# Patient Record
Sex: FEMALE | Race: BLACK OR AFRICAN AMERICAN | NOT HISPANIC OR LATINO | URBAN - METROPOLITAN AREA
[De-identification: names, ages, dates, MRNs, and addresses within clinical notes are randomized per-mention and may not be internally consistent; named-entity substitution may affect disease eponyms.]

---

## 2022-10-09 ENCOUNTER — EMERGENCY (EMERGENCY)
Facility: HOSPITAL | Age: 42
LOS: 1 days | Discharge: HOME | End: 2022-10-09
Attending: STUDENT IN AN ORGANIZED HEALTH CARE EDUCATION/TRAINING PROGRAM
Payer: COMMERCIAL

## 2022-10-09 VITALS
TEMPERATURE: 98 F | DIASTOLIC BLOOD PRESSURE: 89 MMHG | SYSTOLIC BLOOD PRESSURE: 137 MMHG | RESPIRATION RATE: 16 BRPM | OXYGEN SATURATION: 100 % | HEART RATE: 69 BPM

## 2022-10-09 LAB — HCG UR QL: NEGATIVE — SIGNIFICANT CHANGE UP

## 2022-10-09 PROCEDURE — 73590 X-RAY EXAM OF LOWER LEG: CPT | Mod: 26,RT

## 2022-10-09 PROCEDURE — 81025 URINE PREGNANCY TEST: CPT

## 2022-10-09 PROCEDURE — 72125 CT NECK SPINE W/O DYE: CPT | Mod: 26,MA

## 2022-10-09 PROCEDURE — 73564 X-RAY EXAM KNEE 4 OR MORE: CPT | Mod: 26,RT

## 2022-10-09 PROCEDURE — 70450 CT HEAD/BRAIN W/O DYE: CPT | Mod: MA

## 2022-10-09 PROCEDURE — 73564 X-RAY EXAM KNEE 4 OR MORE: CPT

## 2022-10-09 PROCEDURE — 70450 CT HEAD/BRAIN W/O DYE: CPT | Mod: 26,MA

## 2022-10-09 PROCEDURE — 72125 CT NECK SPINE W/O DYE: CPT | Mod: MA

## 2022-10-09 PROCEDURE — 72131 CT LUMBAR SPINE W/O DYE: CPT | Mod: MA

## 2022-10-09 PROCEDURE — 73610 X-RAY EXAM OF ANKLE: CPT

## 2022-10-09 PROCEDURE — 73590 X-RAY EXAM OF LOWER LEG: CPT

## 2022-10-09 PROCEDURE — 99285 EMERGENCY DEPT VISIT HI MDM: CPT

## 2022-10-09 PROCEDURE — 73610 X-RAY EXAM OF ANKLE: CPT | Mod: 26,RT

## 2022-10-09 PROCEDURE — 72131 CT LUMBAR SPINE W/O DYE: CPT | Mod: 26,MA

## 2022-10-09 PROCEDURE — 99284 EMERGENCY DEPT VISIT MOD MDM: CPT | Mod: 25

## 2022-10-09 RX ORDER — LIDOCAINE 4 G/100G
1 CREAM TOPICAL ONCE
Refills: 0 | Status: COMPLETED | OUTPATIENT
Start: 2022-10-09 | End: 2022-10-09

## 2022-10-09 RX ADMIN — Medication 500 MILLIGRAM(S): at 21:39

## 2022-10-09 RX ADMIN — Medication 500 MILLIGRAM(S): at 22:59

## 2022-10-09 RX ADMIN — LIDOCAINE 1 PATCH: 4 CREAM TOPICAL at 21:39

## 2022-10-09 NOTE — ED ADULT TRIAGE NOTE - CHIEF COMPLAINT QUOTE
she [place] and [place] in the [place] 20 [place] ago , no head injury, c/o right ankle, right knee pain and back pain

## 2022-10-09 NOTE — ED ADULT NURSE REASSESSMENT NOTE - NS ED NURSE REASSESS COMMENT FT1
2923 PM - pt  awaiting for  ct scan results, ace wrap and ankle brace, placed by MD GUILLAUME, pt stable NAD endorsed to LOUIE franklin.

## 2022-10-09 NOTE — ED PROVIDER NOTE - NS ED ROS FT
Review of Systems    Constitutional: (-) fever, (-) chills, (-) fatigue  Cardiovascular: (-) chest pain, (-) syncope  Respiratory: (-) cough, (-) shortness of breath  Gastrointestinal: (-) vomiting, (-) diarrhea, (-) abdominal pain  Musculoskeletal: (+) neck pain, (+) back pain  Integumentary: (-) rash, (-) edema, (-) wound  Neurological: (-) headache, (-) altered mental status    Except as documented in the HPI, all other systems are negative.

## 2022-10-09 NOTE — ED PROVIDER NOTE - PROGRESS NOTE DETAILS
Jyothi: pt seen and re-evaluated at bedside.  Pt states her symptoms have improved.  Pt comfortable in NAD.  CT showing pancolitis, no further episodes of vomiting or diarrhea in ED. Unlikely c. diff or invasive infection. Tolerating PO intake. Will DC with GI follow up with return precautions. Pt understood and agreeable with plan. Erin-: XR neg per my wet read, pending radiology read. CT read pending. Ankle air cast applied and pt provided with cane, able to ambulate without assistance. Prasanna DO: Received sign out from Dr. Khan  CT Head: No acute intracranial hemorrhage or calvarial fracture.  CT cervical spine: No acute cervical spine fracture or evidence of   traumatic malalignment.  1.8 cm right thyroid lobe hypodense nodule. Further characterization with   nonemergent thyroid sonogram is recommended, if not previously   characterized.  No acute lumbar spine fracture or evidence of traumatic malalignment.  Enlarged midline pelvic soft tissue structure, likely representing an   enlarged uterus. Further characterization with nonemergent pelvic   sonogram is recommended. Pt feels better, eager to be discharged.  Pt is well appearing, has no new complaints and able to walk with normal gait. Pt is stable for discharge and follow up with medical doctor. Pt educated on care and need for follow up. Discussed anticipatory guidance and return precautions. Questions answered. I had a detailed discussion with the patient and/or guardian regarding the historical points, exam findings, and any diagnostic results supporting the discharge diagnosis.

## 2022-10-09 NOTE — ED PROVIDER NOTE - NSFOLLOWUPINSTRUCTIONS_ED_ALL_ED_FT
Abdominal Pain    Many things can cause abdominal pain. Many times, abdominal pain is not caused by a disease and will improve without treatment. Your health care provider will do a physical exam to determine if there is a dangerous cause of your pain; blood tests and imaging may help determine the cause of your pain. However, in many cases, no cause may be found and you may need further testing as an outpatient. Monitor your abdominal pain for any changes.     Follow up with gastroenterology as discussed (information provided).     SEEK IMMEDIATE MEDICAL CARE IF YOU HAVE ANY OF THE FOLLOWING SYMPTOMS: worsening abdominal pain, uncontrollable vomiting, profuse diarrhea, inability to have bowel movements or pass gas, black or bloody stools, fever accompanying chest pain or back pain, or fainting. These symptoms may represent a serious problem that is an emergency. Do not wait to see if the symptoms will go away. Get medical help right away. Call 911 and do not drive yourself to the hospital. Rest and stay well hydrated.    Take over the counter acetaminophen (Tylenol) 650-1000 mg every 4-6 hours as needed for pain. Do not take more than 3000 mg in a 24 hour period. Be aware many over the counter and prescription medications also contain acetaminophen (Tylenol).     Take prescription naproxen every 12 hours as needed for pain.    Follow up with your primary care physician in 1-3 days.     SEEK IMMEDIATE MEDICAL CARE IF YOU HAVE ANY OF THE FOLLOWING SYMPTOMS: numbness, tingling, or weakness in your arms or legs, severe neck pain, changes in bowel or bladder control, shortness of breath, chest pain, blood in your urine/stool/vomit, headache, visual changes, lightheadedness/dizziness, or fainting. As per radiologist you should obtain ultrasound of your thyroid and pelvis- see Cat scan report.    Rest and stay well hydrated.    Take over the counter acetaminophen (Tylenol) 650-1000 mg every 4-6 hours as needed for pain. Do not take more than 3000 mg in a 24 hour period. Be aware many over the counter and prescription medications also contain acetaminophen (Tylenol).     Take prescription naproxen every 12 hours as needed for pain.    Follow up with your primary care physician in 1-3 days.     SEEK IMMEDIATE MEDICAL CARE IF YOU HAVE ANY OF THE FOLLOWING SYMPTOMS: numbness, tingling, or weakness in your arms or legs, severe neck pain, changes in bowel or bladder control, shortness of breath, chest pain, blood in your urine/stool/vomit, headache, visual changes, lightheadedness/dizziness, or fainting.

## 2022-10-09 NOTE — ED PROVIDER NOTE - PHYSICAL EXAMINATION
CONSTITUTIONAL: non-toxic, well appearing, + airway intact, GCS 15  SKIN: no rash, no petechiae. no ecchymosis, no lacerations  EYES: PERRL, EOMI,  ENT: tongue midline,  NECK: Supple; midline tenderness over cervical and lumbar spine, no point tenderness, no thoracic spine tenderness  CARD: RRR, equal radial pulses bilaterally 2+  RESP: CTAB, no respiratory distress, no crepitus over chest wall  ABD: Soft, non-tender, non-distended  PELVIS: stable  EXT: Tender over knee joint and ankle joint, skin intact, no point tenderness. FROM of hip.   NEURO: Alert, oriented. CN2-12 intact, equal strength bilaterally  PSYCH: Cooperative, appropriate.

## 2022-10-09 NOTE — ED PROVIDER NOTE - PATIENT PORTAL LINK FT
You can access the FollowMyHealth Patient Portal offered by [facility] by registering at the following website: [email or web address]. By joining [facility]’s FollowMyHealth portal, you will also be able to view your health information using other applications (apps) compatible with our system. You can access the FollowMyHealth Patient Portal offered by [facility] by registering at the following website: [email or web address]. By joining [facility]’s FollowMyHealth portal, you will also be able to view your health information using other applications (apps) compatible with our system.

## 2022-10-09 NOTE — ED ADULT NURSE NOTE - OBJECTIVE STATEMENT
States she slipped and fell today , c/o pain to right knee ,sholders,lower back and neckand right ankle .Denied head injury or LOC .

## 2022-10-09 NOTE — ED PROVIDER NOTE - CARE PLAN
1 Principal Discharge DX:	Colitis   Principal Discharge DX:	Head injury  Secondary Diagnosis:	Fall

## 2022-10-09 NOTE — ED PROVIDER NOTE - NSFOLLOWUPCLINICS_GEN_ALL_ED_FT
[facility]  Gastroenterology  [address] [place] [address]  Phone: [phone]  Fax: [phone]     [facility]  Internal Medicine  [address] [place] [address]  Phone: [phone]  Fax: [phone]

## 2022-10-09 NOTE — ED PROVIDER NOTE - CLINICAL SUMMARY MEDICAL DECISION MAKING FREE TEXT BOX
Jyothi: 42 year old female with no pertinent PMH presents s/p slip and fall prior to arrival. Pt states she slipped and fell backwards, hyperextending her right leg and falling onto her back. Denies head strike or LOC, pt unable to ambulate afterwards. Denies any aspirin or AC use. Pt reports neck pain, low back pain, and right leg pain. Denies any fevers, chest pain, shortness of breath, vomiting, headache, vision change, numbness, weakness, or rash. Pt with midline spinal tenderness over lumbar and cervical spine, RLE tender over knee and ankle, extremity NVI. No signs of LOGAN or cord compression. Will obtain imaging r/o fx r/o ICH, supportive treatment with dispo pending workup.

## 2022-10-09 NOTE — ED PROVIDER NOTE - OBJECTIVE STATEMENT
42 year old female with no pertinent PMH presents s/p slip and fall prior to arrival. Pt states she slipped and fell backwards, hyperextending her right leg and falling onto her back. Denies head strike or LOC, pt unable to ambulate afterwards. Denies any aspirin or AC use. Pt reports neck pain, low back pain, and right leg pain. Denies any fevers, chest pain, shortness of breath, vomiting, headache, vision change, numbness, weakness, saddle anesthesia, bowel/bladder dysfunction, or rash. Denies any additional complaints.

## 2022-10-09 NOTE — ED ADULT NURSE REASSESSMENT NOTE - CONDITION
Can take a probiotic (like Align/ Florastor- there are other brands over the counter that will work as well) to prevent loose stools from the antibiotic     improved
